# Patient Record
Sex: FEMALE | Race: BLACK OR AFRICAN AMERICAN | Employment: UNEMPLOYED | URBAN - METROPOLITAN AREA
[De-identification: names, ages, dates, MRNs, and addresses within clinical notes are randomized per-mention and may not be internally consistent; named-entity substitution may affect disease eponyms.]

---

## 2023-11-10 ENCOUNTER — OFFICE VISIT (OUTPATIENT)
Dept: OBGYN | Age: 23
End: 2023-11-10
Payer: MEDICAID

## 2023-11-10 VITALS — DIASTOLIC BLOOD PRESSURE: 61 MMHG | SYSTOLIC BLOOD PRESSURE: 108 MMHG | WEIGHT: 136.7 LBS | HEART RATE: 85 BPM

## 2023-11-10 DIAGNOSIS — N94.6 DYSMENORRHEA: Primary | ICD-10-CM

## 2023-11-10 DIAGNOSIS — Z86.2 HX OF IRON DEFICIENCY ANEMIA: ICD-10-CM

## 2023-11-10 LAB
HCT VFR BLD CALC: 39.2 % (ref 34–48)
HEMOGLOBIN: 13.1 G/DL (ref 11.5–15.5)
MCH RBC QN AUTO: 30 PG (ref 26–35)
MCHC RBC AUTO-ENTMCNC: 33.4 G/DL (ref 32–34.5)
MCV RBC AUTO: 89.9 FL (ref 80–99.9)
PDW BLD-RTO: 11.9 % (ref 11.5–15)
PLATELET # BLD: 250 K/UL (ref 130–450)
PMV BLD AUTO: 11.6 FL (ref 7–12)
RBC # BLD: 4.36 M/UL (ref 3.5–5.5)
WBC # BLD: 4.1 K/UL (ref 4.5–11.5)

## 2023-11-10 PROCEDURE — 1036F TOBACCO NON-USER: CPT | Performed by: FAMILY MEDICINE

## 2023-11-10 PROCEDURE — G8428 CUR MEDS NOT DOCUMENT: HCPCS | Performed by: FAMILY MEDICINE

## 2023-11-10 PROCEDURE — 36415 COLL VENOUS BLD VENIPUNCTURE: CPT | Performed by: FAMILY MEDICINE

## 2023-11-10 PROCEDURE — G8421 BMI NOT CALCULATED: HCPCS | Performed by: FAMILY MEDICINE

## 2023-11-10 PROCEDURE — G8484 FLU IMMUNIZE NO ADMIN: HCPCS | Performed by: FAMILY MEDICINE

## 2023-11-10 PROCEDURE — 99203 OFFICE O/P NEW LOW 30 MIN: CPT | Performed by: FAMILY MEDICINE

## 2023-11-10 RX ORDER — IBUPROFEN 600 MG/1
600 TABLET ORAL EVERY 6 HOURS PRN
Qty: 90 TABLET | Refills: 5 | Status: SHIPPED | OUTPATIENT
Start: 2023-11-10

## 2023-11-10 NOTE — PROGRESS NOTES
Patient alert and pleasant. Here today to discuss menstrual cycle. Blood work ordered, drawn, labeled and sent to the lab. Discharge instructions have been discussed with the patient. Patient advised to call our office with any questions or concerns. Voiced understanding.

## 2023-11-10 NOTE — PROGRESS NOTES
Stephanie Zelaya (:  2000) is a 25 y.o. female,New patient, here for evaluation of the following chief complaint(s):  New Patient (Patient here to discuss menstrual cycle. C/o severe headaches, abdominal cramping, n&v, loss of appetite and general weakness. No Hx of pap smear.)         ASSESSMENT/PLAN:  1. Dysmenorrhea  -     ibuprofen (ADVIL;MOTRIN) 600 MG tablet; Take 1 tablet by mouth every 6 hours as needed for Pain Begin at onset of period as needed for menstrual cramps, Disp-90 tablet, R-5Normal  2. Hx of iron deficiency anemia  -     CBC      Return in about 6 weeks (around 2023). Subjective   SUBJECTIVE/OBJECTIVE:  HPI   here to establish and discuss menstrual cycles  Having painful abdominal cramping, weakness, and nausea since onset of cycles   Menarche age 15, last about 5 days, first few days heavy then regular flow   Hx of anemia, only takes iron during her period  Uses Ibuprofen once per day during cycle along with iron which helps symptoms   Previoulsy on OCPs for 2 months, did not notice a difference  Not sexually active  Denies dysuria, constipation, or breast complaints    Review of Systems    Per HPI    Objective   Physical Exam      Vitals:    11/10/23 1446   BP: 108/61   Pulse: 85        General Appearance:    Alert, cooperative, no distress, appears stated age  Head:    Normocephalic, without obvious abnormality, atraumatic  Lungs:     Respirations unlabored  Heart:    Regular rate and rhythm  Breast Exam:  Deferred  Abdomen:     Soft, non-tender, no masses, no organomegaly  Pelvic Exam Deferred      An electronic signature was used to authenticate this note.     --Jimena Ferrari MD